# Patient Record
Sex: MALE | Race: WHITE | NOT HISPANIC OR LATINO | Employment: FULL TIME | URBAN - NONMETROPOLITAN AREA
[De-identification: names, ages, dates, MRNs, and addresses within clinical notes are randomized per-mention and may not be internally consistent; named-entity substitution may affect disease eponyms.]

---

## 2023-06-21 ENCOUNTER — OFFICE VISIT (OUTPATIENT)
Dept: FAMILY MEDICINE | Facility: CLINIC | Age: 23
End: 2023-06-21
Payer: COMMERCIAL

## 2023-06-21 VITALS
SYSTOLIC BLOOD PRESSURE: 122 MMHG | WEIGHT: 210 LBS | DIASTOLIC BLOOD PRESSURE: 75 MMHG | HEIGHT: 75 IN | HEART RATE: 61 BPM | RESPIRATION RATE: 18 BRPM | BODY MASS INDEX: 26.11 KG/M2

## 2023-06-21 DIAGNOSIS — Z02.4 ENCOUNTER FOR DEPARTMENT OF TRANSPORTATION (DOT) EXAMINATION FOR DRIVING LICENSE RENEWAL: Primary | ICD-10-CM

## 2023-06-21 PROCEDURE — 99455 PR DISABILITY EXAM/TREATING DR: ICD-10-PCS | Mod: ,,, | Performed by: NURSE PRACTITIONER

## 2023-06-21 PROCEDURE — 99455 WORK RELATED DISABILITY EXAM: CPT | Mod: ,,, | Performed by: NURSE PRACTITIONER

## 2023-06-21 NOTE — PROGRESS NOTES
KINA Haley    [address] 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   No follow-ups on file.   Health Maintenance Due   Topic Date Due    Hepatitis C Screening  Never done    Lipid Panel  Never done    COVID-19 Vaccine (1) Never done    HPV Vaccines (1 - Male 2-dose series) Never done    HIV Screening  Never done    TETANUS VACCINE  Never done         Reason for Visit / Chief Complaint:   Employment Physical (For DOT)     History of Present Illness / Problem Focused Workflow     Lexy Shi presents to the clinic with Employment Physical (For DOT)     Here for DOT exam for work- [employer]  Hx reviewed with reports of leg fracture only  No daily medications  UTD immunizations reported  Wears contact lens      Review of Systems     Review of Systems   Constitutional: Negative.    HENT: Negative.     Eyes: Negative.    Respiratory: Negative.     Cardiovascular: Negative.    Gastrointestinal: Negative.    Endocrine: Negative.    Genitourinary: Negative.    Musculoskeletal: Negative.    Integumentary:  Negative.   Allergic/Immunologic: Negative.    Neurological: Negative.    Hematological: Negative.    Psychiatric/Behavioral: Negative.        Medical / Social / Family History   History reviewed. No pertinent past  medical history.    Past Surgical History:   Procedure Laterality Date    TONSILLECTOMY N/A 2003       Social History  Lexy Shi  reports that he has never smoked. He has never been exposed to tobacco smoke. He has never used smokeless tobacco. He reports current alcohol use. He reports that he does not use drugs.    Family History  Lexy Shi's family history includes No Known Problems in his father and mother.    Medications and Allergies   Medications  No outpatient medications have been marked as taking for the 6/21/23 encounter (Office Visit) with KINA Tenorio.       Allergies  Review of patient's allergies indicates:  No Known Allergies    Physical Examination     Vitals:    06/21/23 0954   BP: 122/75   Pulse: 61   Resp: 18    No LMP for male patient.  Physical Exam  Vitals reviewed.   Constitutional:       Appearance: Normal appearance.   HENT:      Head: Normocephalic.      Right Ear: Tympanic membrane, ear canal and external ear normal.      Left Ear: Tympanic membrane, ear canal and external ear normal.      Ears:      Comments: Whisper test greater than 5 feet--- 3/3 words        Nose: Nose normal.      Mouth/Throat:      Mouth: Mucous membranes are moist.   Eyes:      Extraocular Movements: Extraocular movements intact.      Pupils: Pupils are equal, round, and reactive to light.      Comments: Vision Screening  Right eye - Without correction:   With correction: 20/20  Left eye - Without correction:   With correction: 20/20  Both eyes - Without correction:   With correction: 20/20      Cardiovascular:      Rate and Rhythm: Normal rate and regular rhythm.      Pulses: Normal pulses.   Pulmonary:      Effort: Pulmonary effort is normal.      Breath sounds: Normal breath sounds.   Abdominal:      General: Bowel sounds are normal.      Palpations: Abdomen is soft.   Genitourinary:     Comments: Denies lumps/bumps  Musculoskeletal:         General: Normal range of motion.       Cervical back: Normal range of motion and neck supple.   Skin:     General: Skin is warm and dry.      Capillary Refill: Capillary refill takes less than 2 seconds.   Neurological:      General: No focal deficit present.      Mental Status: He is alert and oriented to person, place, and time. Mental status is at baseline.   Psychiatric:         Mood and Affect: Mood normal.         Behavior: Behavior normal.         Thought Content: Thought content normal.      Urinalysis: Sp.Gr. 1.025/ Protein  Negative/ Blood negative/ Sugar negative    LABS:     I have reviewed old labs below:  No results found for: WBC, HGB, HCT, MCV, PLT, NA, K, CL, CALCIUM, PHOS, CO2, GLU, BUN, CREATININE, ANIONGAP, ESTGFRAFRICA, EGFRNONAA, PROT, ALBUMIN, BILITOT, ALKPHOS, ALT, AST, INR, CHOL, TRIG, HDL, LDLCALC, TSH, PSA, GLUF, HGBA1C, MICROALBUR    Signature:  KINA Haley  [address] [place] [address]  Phone #: [phone]  Fax #: [phone]       Date of encounter: 6/21/23    There are no Patient Instructions on file for this visit.

## 2025-06-18 ENCOUNTER — OFFICE VISIT (OUTPATIENT)
Dept: FAMILY MEDICINE | Facility: CLINIC | Age: 25
End: 2025-06-18
Payer: COMMERCIAL

## 2025-06-18 VITALS
TEMPERATURE: 99 F | RESPIRATION RATE: 16 BRPM | HEART RATE: 69 BPM | OXYGEN SATURATION: 98 % | HEIGHT: 74 IN | WEIGHT: 209 LBS | SYSTOLIC BLOOD PRESSURE: 102 MMHG | BODY MASS INDEX: 26.82 KG/M2 | DIASTOLIC BLOOD PRESSURE: 74 MMHG

## 2025-06-18 DIAGNOSIS — Z02.89 ENCOUNTER FOR OTHER ADMINISTRATIVE EXAMINATIONS: Primary | ICD-10-CM

## 2025-06-18 NOTE — PROGRESS NOTES
Jess Beltran DNP, KINA    [address] [place] [address]     PATIENT NAME: Lexy Shi  : 2000  DATE: 25  MRN: [id number]      Billing Provider: Jess Beltran DNP, FNP  Level of Service:   Patient PCP Information       Provider PCP Type    Primary Doctor No General            Reason for Visit / Chief Complaint: DOT Physical       Update PCP  Update Chief Complaint         History of Present Illness / Problem Focused Workflow     Lexy Shi presents to the clinic with DOT Physical      Review of Systems     Review of Systems   Constitutional:  Negative for activity change, appetite change, chills, fatigue and fever.   HENT:  Negative for nasal congestion, ear pain, hearing loss, postnasal drip and sore throat.    Respiratory:  Negative for cough, chest tightness, shortness of breath and wheezing.    Cardiovascular:  Negative for chest pain, palpitations, leg swelling and claudication.   Gastrointestinal:  Negative for abdominal pain, change in bowel habit, constipation, diarrhea, nausea and vomiting.   Genitourinary:  Negative for dysuria.   Musculoskeletal:  Negative for arthralgias, back pain, gait problem and myalgias.   Integumentary:  Negative for rash.   Neurological:  Negative for weakness and headaches.   Psychiatric/Behavioral:  Negative for suicidal ideas. The patient is not nervous/anxious.         Medical / Social / Family History   History reviewed. No pertinent past medical history.    Past Surgical History:   Procedure Laterality Date    TONSILLECTOMY N/A        Social History  Mr. Lexy Shi  reports that he has never smoked. He has never been exposed to tobacco smoke. He has never used smokeless tobacco. He reports current alcohol use. He reports that he does not use drugs.    Family History  Mr. Lexy Shi's family history includes No Known Problems in his father and mother.    Medications and  "Allergies     Medications  No outpatient medications have been marked as taking for the 6/18/25 encounter (Office Visit) with Jess Beltran, CHANTELL, FNP.       Allergies  Review of patient's allergies indicates:  No Known Allergies    Physical Examination     Vitals:    06/18/25 1457   BP: 102/74   BP Location: Left arm   Patient Position: Sitting   Pulse: 69   Resp: 16   Temp: 98.6 °F (37 °C)   TempSrc: Oral   SpO2: 98%   Weight: 94.8 kg (209 lb)   Height: 6' 1.5" (1.867 m)     Physical Exam  Vitals and nursing note reviewed.   Constitutional:       General: He is not in acute distress.     Appearance: Normal appearance. He is not ill-appearing.   Eyes:      Extraocular Movements: Extraocular movements intact.      Pupils: Pupils are equal, round, and reactive to light.   Cardiovascular:      Rate and Rhythm: Normal rate and regular rhythm.      Heart sounds: Normal heart sounds.   Pulmonary:      Effort: Pulmonary effort is normal.      Breath sounds: Normal breath sounds.   Abdominal:      General: Bowel sounds are normal.      Palpations: Abdomen is soft.      Hernia: No hernia is present.   Musculoskeletal:         General: Normal range of motion.   Skin:     Findings: No rash.   Neurological:      General: No focal deficit present.      Mental Status: He is alert and oriented to person, place, and time. Mental status is at baseline.   Psychiatric:         Mood and Affect: Mood normal.         Behavior: Behavior normal.          Assessment and Plan (including Health Maintenance)      Problem List  Smart Sets  Document Outside HM   :    Plan:         Health Maintenance Due   Topic Date Due    Hepatitis C Screening  Never done    HIV Screening  Never done    COVID-19 Vaccine (1 - 2024-25 season) Never done       Problem List Items Addressed This Visit    None  Visit Diagnoses         Encounter for other administrative examinations    -  Primary          Encounter for other administrative examinations       Health " Maintenance Topics with due status: Not Due       Topic Last Completion Date    Influenza Vaccine 02/02/2005    TETANUS VACCINE 08/06/2015    RSV Vaccine (Age 60+ and Pregnant patients) Not Due         No future appointments.     Follow up in about 2 years (around 6/18/2027).     Signature:  Jess Beltran DNP, FNP  [address] [place] [address]  Phone #: [phone]  Fax #: [phone]    Date of encounter: 6/18/25    Patient Instructions   Follow up in 2 years.